# Patient Record
Sex: FEMALE | Race: WHITE | Employment: FULL TIME
[De-identification: names, ages, dates, MRNs, and addresses within clinical notes are randomized per-mention and may not be internally consistent; named-entity substitution may affect disease eponyms.]

---

## 2017-10-08 ENCOUNTER — HEALTH MAINTENANCE LETTER (OUTPATIENT)
Age: 42
End: 2017-10-08

## 2019-08-01 ENCOUNTER — ANCILLARY PROCEDURE (OUTPATIENT)
Dept: GENERAL RADIOLOGY | Facility: CLINIC | Age: 44
End: 2019-08-01
Attending: FAMILY MEDICINE
Payer: COMMERCIAL

## 2019-08-01 ENCOUNTER — OFFICE VISIT (OUTPATIENT)
Dept: URGENT CARE | Facility: URGENT CARE | Age: 44
End: 2019-08-01
Payer: COMMERCIAL

## 2019-08-01 VITALS
DIASTOLIC BLOOD PRESSURE: 89 MMHG | HEART RATE: 82 BPM | TEMPERATURE: 98 F | RESPIRATION RATE: 16 BRPM | SYSTOLIC BLOOD PRESSURE: 134 MMHG | OXYGEN SATURATION: 98 % | WEIGHT: 177 LBS

## 2019-08-01 DIAGNOSIS — M79.672 LEFT FOOT PAIN: Primary | ICD-10-CM

## 2019-08-01 PROCEDURE — 99203 OFFICE O/P NEW LOW 30 MIN: CPT | Performed by: FAMILY MEDICINE

## 2019-08-01 PROCEDURE — 73630 X-RAY EXAM OF FOOT: CPT | Mod: LT

## 2019-08-01 RX ORDER — LISINOPRIL 10 MG/1
10 TABLET ORAL
COMMUNITY
Start: 2019-01-09

## 2019-08-01 RX ORDER — FEXOFENADINE HCL 180 MG/1
180 TABLET ORAL
COMMUNITY

## 2019-08-01 RX ORDER — FLUTICASONE PROPIONATE 50 MCG
1 SPRAY, SUSPENSION (ML) NASAL
COMMUNITY
Start: 2019-05-30

## 2019-08-01 RX ORDER — NORETHINDRONE 0.35 MG/1
TABLET ORAL
Refills: 0 | COMMUNITY
Start: 2019-07-29

## 2019-08-01 SDOH — HEALTH STABILITY: MENTAL HEALTH: HOW OFTEN DO YOU HAVE A DRINK CONTAINING ALCOHOL?: NEVER

## 2019-08-01 NOTE — LETTER
[facility]  [address] [facility] [address]  Phone: [phone]    August 1, 2019        Kerline Hobbs  [address] [facility] [address]          To whom it may concern:    RE: Kerline Hobbs    Patient was seen and treated today at our clinic.  Recommend sedentary work only for 2 weeks  If symptoms persist then patient needs re-evaluation before return to full duty.  If symptoms are 100% resolved by then, may return to full duty.     Please contact me for questions or concerns.      Sincerely,        Romina Vogt MD

## 2019-08-01 NOTE — PATIENT INSTRUCTIONS
Patient Education     Toe Sprain  A sprain is a stretching or tearing of the ligaments that hold a joint together. There are no broken bones. Sprains generally take from 3-6 weeks to heal. A toe sprain may be treated by taping the injured toe to the next toe. This is called buddy taping. This protects the injured toe and holds it in position. Mild sprains may not need any additional support. If the toenail has been hurt badly, it may fall off in 1-2 weeks. A new one will usually start to grow back within a month.  Home care    Keep your leg elevated above heart level when sitting or lying down. This is very important during the first 48 hours to reduce swelling. Stay off the injured foot as much as possible until you can walk on it without pain. If needed, you may use crutches during the first week for this purpose. Crutches can be rented at many pharmacies or surgical/orthopedic supply stores.    You may be given a cast shoe to wear to prevent movement in your toe. If not, you can use a sandal or any shoe that does not put pressure on the injured toe until the swelling and pain go away. If using a sandal, be careful not to hit your foot against anything, since another injury could make the sprain worse.    Apply an ice pack over the injured area for 15 to 20 minutes every 3 to 6 hours. You should do this for the first 24 to 48 hours. You can make an ice pack by filling a plastic bag that seals at the top with ice cubes and then wrapping it with a thin towel. Continue to use ice packs for relief of pain and swelling as needed. As the ice melts, be careful to avoid getting your wrap, splint, or cast wet. As the ice melts, be careful to avoid getting any wrap, splint, tape, or cast wet. After 48 hours, apply heat from a warm shower or bath for 20 minutes several times daily. Alternating ice and heat may also be helpful.    If buddy tape was applied and it becomes wet or dirty, change it. You may replace it with  paper, plastic or cloth tape. Cloth tape and paper tapes must be kept dry. Apply gauze or cotton padding between the toes, especially near webbed area. This will help prevent the skin from getting moist and breaking down. Keep the christy tape in place for at least 4 weeks, or as advised by your healthcare provider.    You may use over-the-counter pain medicine to control pain, unless another pain medicine was prescribed. If you have chronic liver or kidney disease or ever had a stomach ulcer or gastrointestinal bleeding, talk with your healthcare provider before using these medicines.    You may return to sports after healing, when you can run without pain.  Follow-up care  Follow up with your with your healthcare provider as advised. Sometimes fractures don t show up on the first X-ray. Bruises and sprains can sometimes hurt as much as a fracture. These injuries can take time to heal completely. If your symptoms don t improve or they get worse, talk with your healthcare provider. You may need a repeat X-ray. If X-rays were taken, you will be told of any new findings that may affect your care.  When to seek medical advice  Call your healthcare provider right away if any of these occur:    Redness, warmth, or fluid drainage from your toe    Pain or swelling increases    Toes become cold, blue, numb, or tingly  Date Last Reviewed: 5/1/2018 2000-2018 [facility]. [address] [place] [address], [place] [address]. All rights reserved. This information is not intended as a substitute for professional medical care. Always follow your healthcare professional's instructions.

## 2019-08-01 NOTE — LETTER
[facility]  [address] [facility] [address]  Phone: [phone]    August 1, 2019        Kerline Hobbs  [address]  [place] [address]          To whom it may concern:    RE: Kerline Hobbs    Patient was seen and treated today at our clinic.  Recommend sedentary work only for 2 weeks  Needs re-evaluation prior to return to work full duty.     Please contact me for questions or concerns.      Sincerely,        Romina Vogt MD

## 2019-08-01 NOTE — PROGRESS NOTES
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 No pertinent past medical history.      Current Outpatient Medications on File Prior to Visit:  REAGAN 0.35 MG tablet    fexofenadine (ALLEGRA) 180 MG tablet Take 180 mg by mouth   fluticasone (FLONASE) 50 MCG/ACT nasal spray 1 spray   lisinopril (PRINIVIL/ZESTRIL) 10 MG tablet Take 10 mg by mouth     No current facility-administered medications on file prior to visit.     Social History     Tobacco Use     Smoking status: Never Smoker     Smokeless tobacco: Never Used   Substance Use Topics     Alcohol use: Never     Frequency: Never     Drug use: Never       ROS:  review of systems negative except for noted above.   No thoughts of harming self or others.     OBJECTIVE:  /89   Pulse 82   Temp 98  F (36.7  C) (Oral)   Resp 16   Wt 80.3 kg (177 lb)   LMP 07/17/2019   SpO2 98%    General:   awake, alert, and cooperative.  NAD.   Head: Normocephalic, atraumatic.  Eyes: Conjunctiva clear,   Neuro: Alert and oriented - normal speech.  MS: Using extremities freely  (left): foot  Inspection:no swelling seen  Palpation:tender over left 5th distal metatarsal, no tenderness on navicular bone . No proximal fibular tenderness. No calf tenderness. Achilles tendon is intact  Cap refill intact.    Good doralis pedis.  Neurovascularly Intact Distally.   PSYCH:  Normal affect, normal speech  SKIN: no obvious rashes    Diagnostic Test Results:  Results for orders placed or performed in visit on 08/01/19 (from the past 24 hour(s))   XR Foot Left G/E 3 Views    Narrative    XR LEFT FOOT THREE OR MORE VIEWS   8/1/2019 6:29  PM     HISTORY: Left foot pain.    COMPARISON: None.     FINDINGS: There are moderate degenerative changes of the first  metatarsophalangeal joint with mild joint space loss, small osteophyte  formation and subchondral edema in the distal first metatarsal head.  Remaining joint spaces are grossly well-maintained. No acute fracture  or malalignment is identified. Specifically no osseous abnormality is  seen along the lateral aspect of the forefoot at the site marked with  an arrow on the AP view.      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 Otherwise negative left foot x-rays.     CED BALDWIN MD         ASSESSMENT:    ICD-10-CM    1. Left foot pain M79.672 XR Foot Left G/E 3 Views     ORTHO  REFERRAL       PLAN:   Patient pain and tenderness on 5th metatarsal  Suspect ligamentous injury ?occult fracture  Protect with short gray boot  Weight bearing as tolerated  Work restrictions  Follow up with orthopedics   Alarm signs or symptoms discussed, if present recommend go to ER         Advised about symptoms which might herald more serious problems.        Romina Vogt MD

## 2019-08-09 NOTE — PROGRESS NOTES
Kerline Hobbs  :  1975  DOS: 2019  MRN: [id number]    Sports Medicine Clinic Visit    PCP: Elvira Avalos    Kerline Hobbs is a 44 year old female who is seen in consultation at the request of  Romina Vogt M.D. presenting with acute left foot pain.    Injury: 3 weeks - tripped and her foot rolled over the edge of the sandal (inversion type injury).  Pain located over left dorsal distal 4th/5th metatarsal and lateral 5th metatarsal, .dditional Features:  Positive: swelling at the end of the day,pain - aching.  Symptoms are better with walking boot, elevation.  Symptoms are worse with: stairs and walking.  Other evaluation and/or treatments so far consists of: Ice.  Prior imaging: X-rays completed 19.  Prior History of related problems: none    Social History:  supervisor    Review of Systems  Musculoskeletal: as above  Remainder of review of systems is negative including constitutional, CV, pulmonary, GI, Skin and Neurologic except as noted in HPI or medical history.    No past medical history on file.  No past surgical history on file.  No family history on file.    Objective  BP (!) 148/91   Pulse 76   Wt 81.7 kg (180 lb 1.6 oz)   LMP 2019       General: healthy, alert and in no distress      HEENT: no scleral icterus or conjunctival erythema     Skin: no suspicious lesions or rash. No jaundice.     CV: regular rhythm by palpation, 2+ distal pulses, no pedal edema      Resp: normal respiratory effort without conversational dyspnea     Psych: normal mood and affect      Gait: mildly antalgic, appropriate coordination and balance     Neuro: normal light touch sensory exam of the extremities. Motor strength as noted below     Left Ankle/Foot Exam:    Inspection:       no visible ecchymosis       edema noted very mild distal/lateral foot    Foot inspection:       no deformity noted    ROM:        full ROM with dorsiflexion, plantarflexion, inversion and eversion    Tender:        Distal half of 5th MT, head of 4th MT    Non-Tender:       remainder of foot and ankle    Skin:       well perfused       capillary refill less than 2 seconds    Special Tests:       neg (-) anterior drawer        neg (-) talar tilt        neg (-) external rotation testing       Some pain with axial loading of the forefoot    Gait:       antalgic gait, mildly, some stiffness from walking boot    Proprioception:        deferred      Radiology:  Results for orders placed or performed in visit on 08/01/19   XR Foot Left G/E 3 Views    Narrative    XR LEFT FOOT THREE OR MORE VIEWS   8/1/2019 6:29 PM     HISTORY: Left foot pain.    COMPARISON: None.     FINDINGS: There are moderate degenerative changes of the first  metatarsophalangeal joint with mild joint space loss, small osteophyte  formation and subchondral edema in the distal first metatarsal head.  Remaining joint spaces are grossly well-maintained. No acute fracture  or malalignment is identified. Specifically no osseous abnormality is  seen along the lateral aspect of the forefoot at the site marked with  an arrow on the AP view.      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 Otherwise negative left foot x-rays.     CED BALDWIN MD       Assessment:  1. Foot injury, left, subsequent encounter        Plan:  Discussed the assessment with the patient.  Follow up: 2-3 weeks, based on clinical progress  Overall improving pain  Transition from walking boot to dynaflex insert  Footwear strategies overall reviewed in detail  Progress WB activity very slowly  PT available prn  Progressing well clinically so far, but will consider advanced imaging if she regresses  Sprain +/- bone contusion most likely based on hx and exam  Home handouts provided and supportive care reviewed  All questions were answered today  Contact us with additional questions or concerns  Signs and sx of concern reviewed      Blu Gunter DO,  CA  Primary Care Sports Medicine  [facility]

## 2019-08-16 ENCOUNTER — OFFICE VISIT (OUTPATIENT)
Dept: ORTHOPEDICS | Facility: CLINIC | Age: 44
End: 2019-08-16
Payer: COMMERCIAL

## 2019-08-16 VITALS — SYSTOLIC BLOOD PRESSURE: 148 MMHG | DIASTOLIC BLOOD PRESSURE: 91 MMHG | HEART RATE: 76 BPM | WEIGHT: 180.1 LBS

## 2019-08-16 DIAGNOSIS — S99.922D FOOT INJURY, LEFT, SUBSEQUENT ENCOUNTER: Primary | ICD-10-CM

## 2019-08-16 PROCEDURE — 99203 OFFICE O/P NEW LOW 30 MIN: CPT | Performed by: FAMILY MEDICINE

## 2019-08-16 NOTE — LETTER
2019         RE: Kerline Hobbs  [address]  [place] [address]        Dear Colleague,    Thank you for referring your patient, Kerline Hobbs, to the [facility]. Please see a copy of my visit note below.    Kerline Hobbs  :  1975  DOS: 2019  MRN: [id number]    Sports Medicine Clinic Visit    PCP: Elvira Avalos    Kerline Hobbs is a 44 year old female who is seen in consultation at the request of  Romina Vogt M.D. presenting with acute left foot pain.    Injury: 3 weeks - tripped and her foot rolled over the edge of the sandal (inversion type injury).  Pain located over left dorsal distal 4th/5th metatarsal and lateral 5th metatarsal, .dditional Features:  Positive: swelling at the end of the day,pain - aching.  Symptoms are better with walking boot, elevation.  Symptoms are worse with: stairs and walking.  Other evaluation and/or treatments so far consists of: Ice.  Prior imaging: X-rays completed 19.  Prior History of related problems: none    Social History:  supervisor    Review of Systems  Musculoskeletal: as above  Remainder of review of systems is negative including constitutional, CV, pulmonary, GI, Skin and Neurologic except as noted in HPI or medical history.    No past medical history on file.  No past surgical history on file.  No family history on file.    Objective  BP (!) 148/91   Pulse 76   Wt 81.7 kg (180 lb 1.6 oz)   LMP 2019       General: healthy, alert and in no distress      HEENT: no scleral icterus or conjunctival erythema     Skin: no suspicious lesions or rash. No jaundice.     CV: regular rhythm by palpation, 2+ distal pulses, no pedal edema      Resp: normal respiratory effort without conversational dyspnea     Psych: normal mood and affect      Gait: mildly antalgic, appropriate coordination and balance     Neuro: normal light touch sensory exam of the extremities. Motor strength as noted below     Left  Ankle/Foot Exam:    Inspection:       no visible ecchymosis       edema noted very mild distal/lateral foot    Foot inspection:       no deformity noted    ROM:        full ROM with dorsiflexion, plantarflexion, inversion and eversion    Tender:       Distal half of 5th MT, head of 4th MT    Non-Tender:       remainder of foot and ankle    Skin:       well perfused       capillary refill less than 2 seconds    Special Tests:       neg (-) anterior drawer        neg (-) talar tilt        neg (-) external rotation testing       Some pain with axial loading of the forefoot    Gait:       antalgic gait, mildly, some stiffness from walking boot    Proprioception:        deferred      Radiology:  Results for orders placed or performed in visit on 08/01/19   XR Foot Left G/E 3 Views    Narrative    XR LEFT FOOT THREE OR MORE VIEWS   8/1/2019 6:29 PM     HISTORY: Left foot pain.    COMPARISON: None.     FINDINGS: There are moderate degenerative changes of the first  metatarsophalangeal joint with mild joint space loss, small osteophyte  formation and subchondral edema in the distal first metatarsal head.  Remaining joint spaces are grossly well-maintained. No acute fracture  or malalignment is identified. Specifically no osseous abnormality is  seen along the lateral aspect of the forefoot at the site marked with  an arrow on the AP view.      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 Otherwise negative left foot x-rays.     CED BALDWIN MD       Assessment:  1. Foot injury, left, subsequent encounter        Plan:  Discussed the assessment with the patient.  Follow up: 2-3 weeks, based on clinical progress  Overall improving pain  Transition from walking boot to dynaflex insert  Footwear strategies overall reviewed in detail  Progress WB activity very slowly  PT available prn  Progressing well clinically so far, but will consider advanced imaging if she  regresses  Sprain +/- bone contusion most likely based on hx and exam  Home handouts provided and supportive care reviewed  All questions were answered today  Contact us with additional questions or concerns  Signs and sx of concern reviewed      Blu Gunter DO, CAQ  Primary Care Sports Medicine  [facility]                 Again, thank you for allowing me to participate in the care of your patient.        Sincerely,        Blu Gunter DO

## 2019-08-16 NOTE — LETTER
August 16, 2019      Kerline was seen in my office today.  She can return to full work duties without restriction.  Updated recommendations will be provided as needed.      Blu Moser DO, CADARRELL  Primary Care Sports Medicine  [facility]